# Patient Record
Sex: MALE | Race: WHITE
[De-identification: names, ages, dates, MRNs, and addresses within clinical notes are randomized per-mention and may not be internally consistent; named-entity substitution may affect disease eponyms.]

---

## 2018-01-20 ENCOUNTER — HOSPITAL ENCOUNTER (OUTPATIENT)
Dept: HOSPITAL 45 - C.LAB1850 | Age: 53
Discharge: HOME | End: 2018-01-20
Attending: INTERNAL MEDICINE
Payer: COMMERCIAL

## 2018-01-20 DIAGNOSIS — Z12.5: ICD-10-CM

## 2018-01-20 DIAGNOSIS — Z11.59: ICD-10-CM

## 2018-01-20 DIAGNOSIS — R73.9: ICD-10-CM

## 2018-01-20 DIAGNOSIS — Z00.00: Primary | ICD-10-CM

## 2018-01-20 LAB
BUN SERPL-MCNC: 13 MG/DL (ref 7–18)
CALCIUM SERPL-MCNC: 8.9 MG/DL (ref 8.5–10.1)
CO2 SERPL-SCNC: 27 MMOL/L (ref 21–32)
CREAT SERPL-MCNC: 1.06 MG/DL (ref 0.6–1.4)
GLUCOSE SERPL-MCNC: 111 MG/DL (ref 70–99)
KETONES UR QL STRIP: 83 MG/DL
PH UR: 153 MG/DL (ref 0–200)
POTASSIUM SERPL-SCNC: 4 MMOL/L (ref 3.5–5.1)
SODIUM SERPL-SCNC: 135 MMOL/L (ref 136–145)

## 2018-03-19 ENCOUNTER — HOSPITAL ENCOUNTER (OUTPATIENT)
Dept: HOSPITAL 45 - C.LAB1850 | Age: 53
Discharge: HOME | End: 2018-03-19
Attending: NURSE PRACTITIONER
Payer: COMMERCIAL

## 2018-03-19 DIAGNOSIS — R53.83: Primary | ICD-10-CM

## 2018-03-19 LAB
BASOPHILS # BLD: 0.04 K/UL (ref 0–0.2)
BASOPHILS NFR BLD: 0.6 %
EOS ABS #: 0.18 K/UL (ref 0–0.5)
EOSINOPHIL NFR BLD AUTO: 246 K/UL (ref 130–400)
HCT VFR BLD CALC: 42.4 % (ref 42–52)
HGB BLD-MCNC: 15.1 G/DL (ref 14–18)
IG#: 0.01 K/UL (ref 0–0.02)
IMM GRANULOCYTES NFR BLD AUTO: 28.6 %
LYMPHOCYTES # BLD: 2.05 K/UL (ref 1.2–3.4)
MCH RBC QN AUTO: 31.7 PG (ref 25–34)
MCHC RBC AUTO-ENTMCNC: 35.6 G/DL (ref 32–36)
MCV RBC AUTO: 89.1 FL (ref 80–100)
MONO ABS #: 0.5 K/UL (ref 0.11–0.59)
MONOCYTES NFR BLD: 7 %
NEUT ABS #: 4.39 K/UL (ref 1.4–6.5)
NEUTROPHILS # BLD AUTO: 2.5 %
NEUTROPHILS NFR BLD AUTO: 61.2 %
PMV BLD AUTO: 9.9 FL (ref 7.4–10.4)
RED CELL DISTRIBUTION WIDTH CV: 13 % (ref 11.5–14.5)
RED CELL DISTRIBUTION WIDTH SD: 42.5 FL (ref 36.4–46.3)
WBC # BLD AUTO: 7.17 K/UL (ref 4.8–10.8)

## 2018-07-31 ENCOUNTER — HOSPITAL ENCOUNTER (OUTPATIENT)
Dept: HOSPITAL 45 - C.NEUR | Age: 53
Discharge: HOME | End: 2018-07-31
Attending: INTERNAL MEDICINE
Payer: COMMERCIAL

## 2018-07-31 DIAGNOSIS — R40.0: ICD-10-CM

## 2018-07-31 DIAGNOSIS — R06.83: Primary | ICD-10-CM

## 2018-08-01 NOTE — POLYSOMNOGRAPH REPORT
CLINICAL DATA:  The patient is a 53-year-old male with a history of snoring,

disturbed nocturnal sleep, and excessive daytime somnolence.  His Purlear

Sleepiness Scale score is 19.  This was an in-lab overnight polysomnography.

 

SLEEP ARCHITECTURE:  The total sleep period was 433.5 minutes.  The total

sleep time was 380.5 minutes.  The sleep efficiency was borderline normal at

87%.  The sleep latency was 4 minutes.  Wake after sleep onset was 53

minutes.  The REM latency was mildly shorter than normal at 50 minutes. 

There were 3 REM periods during the night.  Sleep consisted of stage N1 of

1%, stage N2 of 72%, stage N3 of 10%, stage REM 17%.

 

AROUSAL DATA:  The patient had a total of 105 arousals including 80

spontaneous arousals, 5 respiratory arousals, 4 PLM arousals, and 16 snoring

arousals.  The arousal index was 17.

 

PERIODIC LIMB MOVEMENT DATA:  The patient had a total of 122 periodic limb

movements for a PLM index of 19.2.  This would be moderately elevated.  There

were only 4 arousals associated with limb movements for a PLM arousal index

of 0.6.

 

ELECTROCARDIOGRAM:  The underlying cardiac rhythm was normal sinus.  The

cardiac rates ranged from 45-90 beats per minute.  The average heart rate was

59 beats per minute.  No cardiac arrhythmias were noted.

 

RESPIRATORY DATA:  The patient had a total of 14 respiratory events including

1 obstructive apnea and 13 hypopneas.  Hypopneas were scored according to the

4% desaturation rule.  The apnea was 24.9 seconds in length.  The mean

duration of the hypopneas was 23.5 seconds.  The apnea hypopnea index was 2.2

events per hour.  This would suggest no significant sleep apnea.

 

OXIMETRY DATA:  The average saturation for the night was 95%.  The minimum

saturation was 85%.  This was very transient.  There was only 0.7 minutes

with saturations less than 89%.

 

TECHNICIAN COMMENTS:  Mr. Driscoll slept in the right, left, and supine

positions.  No cardiac arrhythmia.  PLMs noted.  No bruxism noted.  Snoring

was noted and scored as a 3 on a scale of 1 through 5.  Mr. Driscoll awoke to use

the restroom one time during the night.  He stated he did not sleep as well

as he does in his own bed.  The patient was a restless sleeper.

 

IMPRESSION:

1.  Primary snoring.

2.  Excessive daytime somnolence of undetermined etiology.

 

COMMENTS:  The patient had no significant sleep apnea.  His sleep efficiency

was borderline normal.  Sleep architecture was normal.  He had a modest

number of limb movements, but with few arousals.  He gave no history of

restless legs either before bed or in bed.  There was an increase in

frequency of spontaneous arousals.  I cannot entirely exclude some degree of

upper airway resistance.  He does not meet criteria for treatment at the

present time.

 

RECOMMENDATIONS:

1.  It is advised that the patient be aware of the appropriate principles of

sleep hygiene including having a regular sleep-wake schedule and allowing

approximately 7.5-8 hours of sleep per night.

2.  The patient should follow up with Dr. Silva's office.  He should be

evaluated for other medical conditions that could contribute to daytime

somnolence such as vitamin D deficiency, hypothyroidism, etc.

3.  If possible, the patient should avoid sleeping in the supine position, as

there are typically more respiratory events and snoring while supine.

## 2018-08-01 NOTE — PAP/PSG TECHNICIAN REPORT
[facility]

Technician Polysomnogram Report



Study name:

None

Report date:

8/1/2018



Study date:

7/31/2018

Referring Physician:

DR. ROSADO PRO



Name:

KSENIA VASQUEZ

Interpreting Physician:

Moisés Jara D.O.



YOB: 1965

Technician:

Goldie Cui, LUIS FT.



Sex:

Male







Age:

53

StudyType:

PSG



Weight:

189 lbs









Height:

53 years, Height 6' 0"

Neck Circum:16 inches







BMI:

25.63







Medications:

Claritin, Melatonin















Patient History





53-year-old male presents to the sleep lab for a split night study. Patient states he wakes often 
during sleep, wakes unrefreshed=19, Neck=16 inches







Parameters Monitored

NPSG: E1-M2, E2-M1, Fp1-M2, Fp2-M1, F3-M2, F4-M2, F4-M1, C3-M2, C4-M2, C4-M1, O1-M2, O2-M2,

O2-M1, T3-M2, T4-M1, P3-M2, P4-M1, CHIN1, CHIN2, HR, EKG, Legs, PFLOW, SNOR, FLOW, CFLOW,

Tidal Volume, THOR, ABDO, SpO2, PLTH, CPRESS, ETCO2 Wave, ETCO2, pH





Sleep Architecture



Sleep Stages



Time at Lights Off

10:13:30 PM



STAGES

Time (min.)

TST (%)



Time at Lights On

5:31:00 AM



Wake

56.0

--



Total Recording Time (TRT)

437.00 min.



N1

5.0

1



Total Sleep Period (TSP)

433.5 min.



N2

274.5

72



Total Sleep Time (TST)

380.5min.



N3

37.5

10



Awake Time

56.0 min.



REM

63.5

17



Wake after Sleep Onset

53.0 min.











Sleep Efficiency (SE)

87 %











Sleep Onset Latency (SOL)

4.0 min.











Number of Stage 1 Shifts

None











Awakenings

4











Stage Changes

44











Number of REM periods

8



REM

63.5

17



REM Latency

50.0 min.



NREM

317.0

83





Body Position Analysis





Supine

Right

Left

Side

Prone

Vertical



Total Sleep Time (min.)

90.7

186.5

116.4

302.86

0.0

0.0



Total Sleep Time (%)

20%

49%

31%

80

0%

N/A%



Total Sleep Time REM (min.)

9.5

25.5

28.5

None

0.0

0.0



Total Sleep Time NREM (min.)

68.1

161.0

87.9

None

0.0

0.0



Intermittent Wake (min.)

13.0

6.0

37.0

None

0.0

0.0



Total Sleep Period (%)

20%

None





Arousals







Myoclonus (PLM) *







Events

Count

Index



Events

Count

Index



Spontaneous

80

13



Events Awake (PLMW)

0

0.0



Respiratory

5

0.8



Events Asleep w/ Arousal (PLMA)

4

0.6



PLM

4

1



Events Asleep w/o Arousal (PLMS)

118

18.6



Snoring

16

3



Total Asleep

122

19.2



Total

105

17



Total

122

17







Respiratory Analysis *





CA

OA

MA

CH

H

RERA

Total



Count

0

1

0

0

13

0

14



Index

0.0

0.2

0.0

0

2.0

0

2.2



Mean Duration

0.0

24.9

0.0

0.00

23.5

0.0

23.6



Longest Duration

0.0

24.9

0.0

0.00

0.0

0.0

44.6







Respiratory Event Summary 







Total

Supine

~Supine

Right

Left

Prone

REM

NREM



Apneas

Count

1

1

0

N/A

1

0





Index

0.2

1

0

0.0

0.0

N/A

1

0



Hypopneas (4% Desat)

Count

13

11

2

2

0

N/A

6

7





Index

2.0

8.5

0

0.6

0.0

N/A

5.7

1.3



Apneas & All Hypopneas 

Count

14

12

2

2

0

N/A

7

7





Index

2.2

9

0

1

0

N/A

6.6

1.3



Respiratory Events 

(Apn+All Hyp+RERA)

Count

14

12

2

2

0

N/A

7

7





Index

2.2

9

0

0.6

0.0

N/A

6.6

1.3



Respiratory Related Arousal

Count

5

12

2

2

0

N/A

1

4





Index

0.8

2

0

1

0

N/A

1

1





Snoring Analysis





Supine

Right

Left

Prone

REM

NREM

Total



Snore duration

23.0 min



Snores count

581

124

34

N/A

86

653

739



Snore mean duration

1.9 Sec



Snores index

449

40

18

N/A

81.3

123.6

116.5



TST with snoring (%)

6.0%







Desaturation Event Summary:



Minimum %SpO2

Event Count

Mean/Min/Max Duration(sec.)

Desaturation Index

% Time In Bed



> 90

17

38.2 / 14.8 / 59.5

2.4

99.6



86 - 90

0

N/A

0.0

0.4



81 - 85

0

N/A

0.0

0.0



76 - 80

0

N/A

0.0

0.0



71 - 75

0

N/A

0.0

0.0



66 - 70

0

N/A

0.0

0.0



61 - 65

0

N/A

0.0

0.0



56 - 60

0

N/A

0.0

0.0



51 - 55

0

N/A

0.0

0.0



< 50

0

N/A

0.0

0.0









Total

REM

NREM

Awake



<50%

0.0 min.



51 - 60%

0.0 min.



61 - 70%

0.0 min.



71 - 80%

0.0 min.



81 - 90%

1.9 min.

1.3 min.

0.4 min.

0.2 min.



91 - 100%

429.5 min.

62.2 min.

316.6 min.

50.7 min.



Average

95

94



Minimum SpO2

85

86

85

89



Desaturation Event Index

2.3

6.6

1.9

0.0



# Desat. Events below 89%

3

2

1

N/A



Time(%) with Saturation below 89%

0.2

0.1

0.1

0.0



Time(min.) with Saturation below 89%

0.7

0.4

0.3

0.0









Time (mins)

REM (mins)

NREM (mins)

% of TST



SpO2 Below 90%

6

3

N3

0.3



SpO2 Below 88%

1

0





Heart Rate Analysis









Min (bpm)

Max (bpm)

Average (bpm)



Awake

49

87

59



NREM

45

90

58



REM

47

86

60



Overall

45

90

59













Supplemental O2 Values



Minimum O2 level: None



Value  

Start Time

End Time





Technician Comments







 PSG Study



Mr. Vasquez slept in the right, left, and supine positions.  No cardiac arrhythmia. PLM's noted.  No 
bruxism noted.  Snoring was noted and scored as a 3 on a scale of 1 through 5. (0=no snoring, 
5=snoring loud enough to be heard through a closed door or down the [address]) Mr. Vasquez awoke to use 
the restroom one time during the night.  Mr. Vasquez stated, I did not sleep as well as I do when I am 
in my own bed. 

The final report will be interpreted and signed by a sleep physician. The completed physician report 
will then be placed in the patient medical record



Patient was a restless sleeper; moderate snoring was displayed. No significant respiratory events 
were displayed. Patient stated that he is so tired most of the times that he doesn't know what to do 
with himself.





 



 



 



 



 



 



 



 

 



Therapy (cm H2O)

0



TIB (min.)

436.5



TST (min.)

380.5



Sleep Onset (min.)

4.0



REM Onset From Sleep (min.)

50.0



Sleep Efficiency %

87



Wakefulness (%)

13



Wakefulness (min.)

56.0



NREM 1 (%)

1



NREM 1 (min.)

5.0



NREM 2 (%)

72



NREM 2 (min.)

274.5



NREM 3 (%)

10



NREM 3 (min.)

37.5







REM (%)

17



REM (min.)

63.5



# Arousals

105



Arousal Index

17



# Snore

739



Snore Index

116.5



AHI

2.2



AHI Supine

9



AHI Non-Supine

0



NREM AHI

1.3



REM AHI

6.6



RDI

2.2



# Obstructive Apnea

1



# Central Apnea

0



# Mixed Apnea

0







# Hypopneas

13



RERAs

0



Total Respiratory Events

17



Time Below SpO2 89% (min.)

0.7



Mean NREM SpO2 (%)

95



Mean REM SpO2 (%)

95



Mean Sleep SpO2 (%)

95



Min NREM SpO2 (%)

85



Min REM SpO2 (%)

86



Position Supine (min.)

90.7



Position Non-supine (min.)

302.9



LM Index Sleep

19.2



LM Index NREM

21.2



LM Index REM

9.4







Mean Heart Rate (bpm)

59



Min Heart Rate (bpm)

45